# Patient Record
Sex: MALE
[De-identification: names, ages, dates, MRNs, and addresses within clinical notes are randomized per-mention and may not be internally consistent; named-entity substitution may affect disease eponyms.]

---

## 2017-07-03 ENCOUNTER — HOSPITAL ENCOUNTER (EMERGENCY)
Dept: HOSPITAL 14 - H.ER | Age: 37
Discharge: HOME | End: 2017-07-03
Payer: COMMERCIAL

## 2017-07-03 VITALS
OXYGEN SATURATION: 96 % | SYSTOLIC BLOOD PRESSURE: 121 MMHG | DIASTOLIC BLOOD PRESSURE: 83 MMHG | TEMPERATURE: 98 F | HEART RATE: 91 BPM | RESPIRATION RATE: 18 BRPM
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
## 2017-07-03 DIAGNOSIS — S80.12XA: Primary | ICD-10-CM

## 2017-07-03 NOTE — RAD
PROCEDURE:  Radiographs of the left tibia and fibula. 



HISTORY:

Leg injury 



COMPARISON:

None available.



TECHNIQUE:

Frontal and lateral views obtained. 



FINDINGS:



BONES:

Bone alignment and mineralization are normal. No fracture or 

destructive lesion.



JOINT SPACES:

Unremarkable.



OTHER FINDINGS:

None.



IMPRESSION:

No acute fracture or dislocation.
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
## 2018-11-13 ENCOUNTER — HOSPITAL ENCOUNTER (EMERGENCY)
Dept: HOSPITAL 14 - H.ER | Age: 38
Discharge: HOME | End: 2018-11-13
Payer: COMMERCIAL

## 2018-11-13 VITALS
RESPIRATION RATE: 18 BRPM | HEART RATE: 76 BPM | TEMPERATURE: 98.1 F | DIASTOLIC BLOOD PRESSURE: 86 MMHG | SYSTOLIC BLOOD PRESSURE: 126 MMHG

## 2018-11-13 DIAGNOSIS — B34.9: Primary | ICD-10-CM

## 2018-11-13 LAB
BASOPHILS # BLD AUTO: 0.1 K/UL (ref 0–0.2)
BASOPHILS NFR BLD: 0.9 % (ref 0–2)
BUN SERPL-MCNC: 15 MG/DL (ref 9–20)
CALCIUM SERPL-MCNC: 9.5 MG/DL (ref 8.4–10.2)
EOSINOPHIL # BLD AUTO: 0.1 K/UL (ref 0–0.7)
EOSINOPHIL NFR BLD: 1.5 % (ref 0–4)
ERYTHROCYTE [DISTWIDTH] IN BLOOD BY AUTOMATED COUNT: 13.8 % (ref 11.5–14.5)
GFR NON-AFRICAN AMERICAN: > 60
HGB BLD-MCNC: 14.7 G/DL (ref 12–18)
LYMPHOCYTES # BLD AUTO: 1.2 K/UL (ref 1–4.3)
LYMPHOCYTES NFR BLD AUTO: 19.1 % (ref 20–40)
MCH RBC QN AUTO: 29.9 PG (ref 27–31)
MCHC RBC AUTO-ENTMCNC: 33.8 G/DL (ref 33–37)
MCV RBC AUTO: 88.5 FL (ref 80–94)
MONOCYTES # BLD: 0.3 K/UL (ref 0–0.8)
MONOCYTES NFR BLD: 5.1 % (ref 0–10)
NEUTROPHILS # BLD: 4.4 K/UL (ref 1.8–7)
NEUTROPHILS NFR BLD AUTO: 73.4 % (ref 50–75)
NRBC BLD AUTO-RTO: 0 % (ref 0–0)
PLATELET # BLD: 237 K/UL (ref 130–400)
PMV BLD AUTO: 8 FL (ref 7.2–11.7)
RBC # BLD AUTO: 4.9 MIL/UL (ref 4.4–5.9)
WBC # BLD AUTO: 6 K/UL (ref 4.8–10.8)

## 2018-11-13 PROCEDURE — 96374 THER/PROPH/DIAG INJ IV PUSH: CPT

## 2018-11-13 PROCEDURE — 85025 COMPLETE CBC W/AUTO DIFF WBC: CPT

## 2018-11-13 PROCEDURE — 87804 INFLUENZA ASSAY W/OPTIC: CPT

## 2018-11-13 PROCEDURE — 96361 HYDRATE IV INFUSION ADD-ON: CPT

## 2018-11-13 PROCEDURE — 99284 EMERGENCY DEPT VISIT MOD MDM: CPT

## 2018-11-13 PROCEDURE — 71046 X-RAY EXAM CHEST 2 VIEWS: CPT

## 2018-11-13 PROCEDURE — 80048 BASIC METABOLIC PNL TOTAL CA: CPT

## 2018-11-13 NOTE — ED PDOC
HPI: SOB/CHF/COPD


Time Seen by Provider: 11/13/18 17:41


Chief Complaint (Nursing): Shortness Of Breath


Chief Complaint (Provider): Shortness Of Breath


History Per: Patient


History/Exam Limitations: no limitations


Onset/Duration Of Symptoms: Days (x2)


Current Symptoms Are (Timing): Still Present


Additional Complaint(s): 


38 year old male presents to ED with complaints of difficulty breathing 

associated with mild headache, occasional non-productive cough, and diffuse mu

sculoskeletal back pain for the past 2 days. She denies any fever, chills, chest

pain, or taking medications for relief. Patient reports some family members had 

similar flu-like symptoms. 





PCP: none provided





Past Medical History


Reviewed: Historical Data, Nursing Documentation, Vital Signs


Vital Signs: 





                                Last Vital Signs











Temp  98.3 F   11/13/18 17:56


 


Pulse  74   11/13/18 17:56


 


Resp  16   11/13/18 17:56


 


BP  116/78   11/13/18 17:56


 


Pulse Ox  97   11/13/18 17:56














- Medical History


PMH: No Chronic Diseases





- Surgical History


Surgical History: No Surg Hx





- Family History


Family History: States: Unknown Family Hx





- Immunization History


Hx Tetanus Toxoid Vaccination: No


Hx Influenza Vaccination: No


Hx Pneumococcal Vaccination: No





- Home Medications


Home Medications: 


                                Ambulatory Orders











 Medication  Instructions  Recorded


 


Amoxicillin [Amoxil 500 mg Cap] 500 mg PO BID #20 cap 02/08/17


 


Ibuprofen [Motrin] 600 mg PO Q8 PRN #15 tab 07/03/17














- Allergies


Allergies/Adverse Reactions: 


                                    Allergies











Allergy/AdvReac Type Severity Reaction Status Date / Time


 


No Known Allergies Allergy   Unverified 02/08/17 17:34














Review of Systems


ROS Statement: Except As Marked, All Systems Reviewed And Found Negative


Constitutional: Negative for: Fever, Chills


Cardiovascular: Negative for: Chest Pain


Respiratory: Positive for: Cough, Shortness of Breath.  Negative for: Sputum


Gastrointestinal: Negative for: Vomiting


Musculoskeletal: Positive for: Back Pain (diffuse)


Neurological: Positive for: Headache (mild)





Physical Exam





- Reviewed


Nursing Documentation Reviewed: Yes


Vital Signs Reviewed: Yes





- Physical Exam


Appears: Positive for: Non-toxic, No Acute Distress


Head Exam: Positive for: ATRAUMATIC, NORMAL INSPECTION, NORMOCEPHALIC


Skin: Positive for: Normal Color.  Negative for: Rash


Eye Exam: Positive for: Normal appearance


ENT: Positive for: TM Is/Are (clear and nonbulging bilaterally), Pharyngeal 

Erythema.  Negative for: Tonsillar Exudate, Tonsillar Swelling


Neck: Positive for: Normal


Cardiovascular/Chest: Positive for: Regular Rate, Rhythm, Chest Non Tender


Respiratory: Positive for: Normal Breath Sounds.  Negative for: Wheezing, 

Respiratory Distress


Gastrointestinal/Abdominal: Positive for: Normal Exam, Soft.  Negative for: 

Tenderness


Extremity: Positive for: Normal ROM (upper/lower)


Neurologic/Psych: Positive for: Alert, Oriented





- Laboratory Results


Result Diagrams: 


                                 11/13/18 18:27





                                 11/13/18 18:27





- ECG


O2 Sat by Pulse Oximetry: 97 (RA)


Pulse Ox Interpretation: Normal





Medical Decision Making


Medical Decision Making: 


Initial Impression: 


Initial Plan: work-up for influenza vs. upper/lower respiratory infection. A

nticipate discharge home.


* Labs


* CXR


* IV fluids


* Toradol 15mg IVP


* Influenza A B


* Re-evaluation





1900


--Labs reviewed: no significant clinical abnormality. Negative for influenza. 


--Patient is signed out to Dr. Multani pending CXR results and re-evaluation.





----------------

--------------------------------------------------------------------------------


------------------------


Scribe Attestation:


Documented by Kamini Hendrickson, acting as a scribe for Matilde Izaguirre MD.


Provider Scribe Attestation:


All medical record entries made by the Scribe were at my direction and 

personally dictated by me. I have reviewed the chart and agree that the record 

accurately reflects my personal performance of the history, physical exam, 

medical decision making, and the department course for this patient. I have also

 personally directed, reviewed, and agree with the discharge instructions and 

disposition.





Disposition





- Clinical Impression


Clinical Impression: 


 Viral infection








- Disposition


Disposition Time: 19:00


Condition: STABLE


Instructions:  Viral Upper Respiratory Infection, Adult (DC)


Forms:  [employer] [facility] ([place])


Print Language: Lao

## 2018-11-13 NOTE — ED PDOC
- Laboratory Results


Result Diagrams: 


                                 11/13/18 18:27





                                 11/13/18 18:27





- ECG


O2 Sat by Pulse Oximetry: 97 (RA)


Pulse Ox Interpretation: Normal





Medical Decision Making


Medical Decision Making: 


Time: 1900


--Patient is endorsed to provider by Dr. Izaguirre pending CXR results and re-

evaluation.





Time: 1948


--CXR reviewed: no active disease. Upon provider reevaluation, patient is 

medically stable and requires no further treatment in the ED at this time. 

Patient will be discharged home. Counseling was provided and all questions were 

answered regarding diagnosis. There is agreement to discharge plan. Return if 

symptoms persist or worsen.





Clinical Impression: Viral infection





 

--------------------------------------------------------------------------------


----------------------------------------


Scribe Attestation:


Documented by Kamini Hendrickson, acting as a scribe for Bobby Multani MD.


Provider Scribe Attestation:


All medical record entries made by the Scribe were at my direction and 

personally dictated by me. I have reviewed the chart and agree that the record 

accurately reflects my personal performance of the history, physical exam, 

medical decision making, and the department course for this patient. I have also

personally directed, reviewed, and agree with the discharge instructions and 

disposition.





Disposition


Counseled Patient/Family Regarding: Studies Performed, Diagnosis, Need For 

Followup





- Clinical Impression


Clinical Impression: 


 Viral infection








- POA


Present On Arrival: None





- Disposition


Disposition: Routine/Home


Disposition Time: 19:48


Condition: STABLE


Instructions:  Viral Upper Respiratory Infection, Adult (DC)


Forms:  [employer] (Uzbek)


Print Language: Georgian

## 2018-11-14 NOTE — RAD
Date of service: 



11/13/2018



HISTORY:

Chest tightness 



COMPARISON:

No prior.



TECHNIQUE:

Chest PA and lateral



FINDINGS:



LINES AND TUBES:

None. 



LUNG AND PLEURA:

The lungs are well inflated and clear. No pleural effusion or 

pneumothorax.



HEART AND MEDIASTINUM:

The heart is not enlarged.  No aortic atherosclerotic calcification 

present.  The hilar and mediastinal contours are within normal limits.



SKELETAL STRUCTURES:

The bony structures are within normal limits for the patient's age.



VISUALIZED UPPER ABDOMEN:

Normal.



OTHER FINDINGS:

None.



IMPRESSION:

No active pulmonary disease.

## 2018-11-16 VITALS — OXYGEN SATURATION: 97 %

## 2018-12-17 ENCOUNTER — HOSPITAL ENCOUNTER (EMERGENCY)
Dept: HOSPITAL 14 - H.ER | Age: 38
Discharge: HOME | End: 2018-12-17
Payer: COMMERCIAL

## 2018-12-17 VITALS
DIASTOLIC BLOOD PRESSURE: 78 MMHG | HEART RATE: 83 BPM | RESPIRATION RATE: 16 BRPM | OXYGEN SATURATION: 98 % | SYSTOLIC BLOOD PRESSURE: 119 MMHG | TEMPERATURE: 98.2 F

## 2018-12-17 DIAGNOSIS — R06.02: Primary | ICD-10-CM

## 2018-12-17 DIAGNOSIS — J44.9: ICD-10-CM

## 2018-12-17 LAB
ALBUMIN SERPL-MCNC: 4.3 G/DL (ref 3.5–5)
ALBUMIN/GLOB SERPL: 1.2 {RATIO} (ref 1–2.1)
ALT SERPL-CCNC: 39 U/L (ref 21–72)
AST SERPL-CCNC: 27 U/L (ref 17–59)
BASOPHILS # BLD AUTO: 0 K/UL (ref 0–0.2)
BASOPHILS NFR BLD: 0.5 % (ref 0–2)
BUN SERPL-MCNC: 14 MG/DL (ref 9–20)
CALCIUM SERPL-MCNC: 9.1 MG/DL (ref 8.4–10.2)
EOSINOPHIL # BLD AUTO: 0.1 K/UL (ref 0–0.7)
EOSINOPHIL NFR BLD: 1.5 % (ref 0–4)
ERYTHROCYTE [DISTWIDTH] IN BLOOD BY AUTOMATED COUNT: 14 % (ref 11.5–14.5)
GFR NON-AFRICAN AMERICAN: > 60
HGB BLD-MCNC: 14.9 G/DL (ref 12–18)
LYMPHOCYTES # BLD AUTO: 1.6 K/UL (ref 1–4.3)
LYMPHOCYTES NFR BLD AUTO: 30.4 % (ref 20–40)
MCH RBC QN AUTO: 30.3 PG (ref 27–31)
MCHC RBC AUTO-ENTMCNC: 33.5 G/DL (ref 33–37)
MCV RBC AUTO: 90.6 FL (ref 80–94)
MONOCYTES # BLD: 0.3 K/UL (ref 0–0.8)
MONOCYTES NFR BLD: 5.4 % (ref 0–10)
NEUTROPHILS # BLD: 3.3 K/UL (ref 1.8–7)
NEUTROPHILS NFR BLD AUTO: 62.2 % (ref 50–75)
NRBC BLD AUTO-RTO: 0.3 % (ref 0–0)
PLATELET # BLD: 248 K/UL (ref 130–400)
PMV BLD AUTO: 8.5 FL (ref 7.2–11.7)
RBC # BLD AUTO: 4.9 MIL/UL (ref 4.4–5.9)
WBC # BLD AUTO: 5.4 K/UL (ref 4.8–10.8)

## 2018-12-17 NOTE — RAD
Date of service: 



12/17/2018



HISTORY:

Shortness of breath 



COMPARISON:

11/13/2018



TECHNIQUE:

Chest PA and lateral



FINDINGS:



LINES AND TUBES:

None. 



LUNG AND PLEURA:

The lungs are well inflated and clear. No pleural effusion or 

pneumothorax.



HEART AND MEDIASTINUM:

The heart is not enlarged.  No aortic atherosclerotic calcification 

present.  The hilar and mediastinal contours are within normal limits.



SKELETAL STRUCTURES:

The bony structures are within normal limits for the patient's age.



VISUALIZED UPPER ABDOMEN:

Normal.



OTHER FINDINGS:

None.



IMPRESSION:

No active pulmonary disease.

## 2018-12-17 NOTE — ED PDOC
HPI: SOB/CHF/COPD


Time Seen by Provider: 12/17/18 11:43


Chief Complaint (Nursing): Chest Pain


Chief Complaint (Provider): Breathing Problem


History Per: Patient,  (Basil, #[id number])


History/Exam Limitations: no limitations


Onset/Duration Of Symptoms: Other (x4 months)


Current Symptoms Are (Timing): Still Present


Additional Complaint(s): 


38 year old male presents to the ED for evaluation of a "breathing problem" for 

the past four months where he feels shortness of breath at various times daily 

with no chest pain, fever, or cough. Denies feeling short of breath currently.





PMD: none provided





Past Medical History


Reviewed: Historical Data, Nursing Documentation, Vital Signs


Vital Signs: 





                                Last Vital Signs











Temp  98.4 F   12/17/18 10:52


 


Pulse  76   12/17/18 10:52


 


Resp  20   12/17/18 10:52


 


BP  121/76   12/17/18 10:52


 


Pulse Ox  97   12/17/18 10:52














- Medical History


PMH: No Chronic Diseases





- Surgical History


Surgical History: No Surg Hx





- Family History


Family History: States: Unknown Family Hx





- Social History


Current smoker - smoking cessation education provided: No


Alcohol: None


Drugs: Denies





- Immunization History


Hx Tetanus Toxoid Vaccination: No


Hx Influenza Vaccination: No


Hx Pneumococcal Vaccination: No





- Home Medications


Home Medications: 


                                Ambulatory Orders











 Medication  Instructions  Recorded


 


Amoxicillin [Amoxil 500 mg Cap] 500 mg PO BID #20 cap 02/08/17


 


Ibuprofen [Motrin] 600 mg PO Q8 PRN #15 tab 07/03/17














- Allergies


Allergies/Adverse Reactions: 


                                    Allergies











Allergy/AdvReac Type Severity Reaction Status Date / Time


 


No Known Allergies Allergy   Unverified 02/08/17 17:34














Curb-65 Severity Score





- CURB-65 Severity Score


Confusion: No


Bun >19mg/dl (>7mmol/L): No


Respiratory Rate greater than/equal to 30: No


Systolic BP <90 or Diastolic BP less than/equal 60mmHg: No


Age >64: No


Curb-65 Score: 0


Percentage 30-day mortality: 0.6%





Wells Criteria for PE





- Wells Criteria for Pulmonary Embolism


Clinical Signs and Symptoms of DVT: No


P.E is #1 Diagnosis, or Equally Likely: No


Heart Rate >100: No


Immobilization at least 3 days;Surgery previous 4 weeks: No


Previous, objectively diagnosed PE or DVT: No


Hemoptysis: No


Malignancy w/treatment within 6 months, or palliative: No


Total Score: 0





Review of Systems


ROS Statement: Except As Marked, All Systems Reviewed And Found Negative


Constitutional: Negative for: Fever


Cardiovascular: Negative for: Chest Pain


Respiratory: Positive for: Shortness of Breath (intermittently, but not 

currently), Other (a "breathing problem").  Negative for: Cough





Physical Exam





- Reviewed


Nursing Documentation Reviewed: Yes


Vital Signs Reviewed: Yes





- Physical Exam


Appears: Positive for: No Acute Distress


Head Exam: Positive for: ATRAUMATIC, NORMOCEPHALIC


Skin: Positive for: Normal Color, Warm, Dry


Eye Exam: Positive for: Normal appearance, EOMI, PERRL


ENT: Positive for: Normal ENT Inspection


Neck: Positive for: Normal, Painless ROM, Supple


Cardiovascular/Chest: Positive for: Regular Rate, Rhythm


Respiratory: Positive for: Normal Breath Sounds.  Negative for: Accessory Muscle

 Use, Wheezing, Respiratory Distress


Gastrointestinal/Abdominal: Positive for: Normal Exam, Soft.  Negative for: 

Tenderness


Back: Positive for: Normal Inspection


Extremity: Positive for: Normal ROM


Neurologic/Psych: Positive for: Alert, Oriented (x3)





- Laboratory Results


Result Diagrams: 


                                 12/17/18 12:00





                                 12/17/18 12:00





- ECG


O2 Sat by Pulse Oximetry: 97 (RA)


Pulse Ox Interpretation: Normal





Medical Decision Making


Medical Decision Making: 


Time: 1158


Initial Impression: difficulty breathing


Initial Plan: 


--CMP


--Trop I


--CBC with differential


--CXR


--Albuterol 2.5mg INH


--Peak flow pre/post





1302 


Patient reports improvement in symptoms. He is stable for discharge and will be 

referred to the clinic. All questions answered at this time.





--------------------------------------------------------

-----------------------------------------


Scribe Attestation:


Documented by Zina Fink acting as a scribe for Azar Alvarado MD.


Provider Scribe Attestation:


All medical record entries made by the Scribe were at my direction and 

personally dictated by me. I have reviewed the chart and agree that the record 

accurately reflects my personal performance of the history, physical exam, 

medical decision making, and the department course for this patient. I have also

 personally directed, reviewed, and agree with the discharge instructions and 

disposition.





Disposition





- Clinical Impression


Clinical Impression: 


 Shortness of breath








- Patient ED Disposition


Is Patient to be Admitted: No





- Disposition


Referrals: 


[facility] [Outside]


[facility] [Outside]


Disposition: Routine/Home


Condition: STABLE


Additional Instructions: 


follow up with clinic in 1-2 days


return to the ED with any worsening or concerning symptoms


Instructions:  Shortness of Breath (Dyspnea)


Forms:  [facility] (English), [facility] (Italian)

## 2019-04-26 ENCOUNTER — HOSPITAL ENCOUNTER (EMERGENCY)
Dept: HOSPITAL 14 - H.ER | Age: 39
Discharge: HOME | End: 2019-04-26
Payer: SELF-PAY

## 2019-04-26 VITALS
SYSTOLIC BLOOD PRESSURE: 117 MMHG | HEART RATE: 68 BPM | RESPIRATION RATE: 18 BRPM | TEMPERATURE: 99.4 F | DIASTOLIC BLOOD PRESSURE: 86 MMHG | OXYGEN SATURATION: 99 %
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
## 2019-04-26 DIAGNOSIS — S80.212A: Primary | ICD-10-CM
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
## 2019-04-27 NOTE — RAD
Date of service: 



04/26/2019



PROCEDURE:  Left Knee Radiographs.



HISTORY:

Pain.



COMPARISON:

None.



TECHNIQUE:

2 views obtained.



FINDINGS:



BONES:

Normal. No fracture. 



JOINTS:

Normal. No osteoarthritis. 



JOINT EFFUSION:

Questionable trace joint effusion 



OTHER FINDINGS:

None.  



IMPRESSION:

No evidence of acute displaced fracture nor dislocation..  

Questionable trace joint effusion